# Patient Record
Sex: MALE | Race: WHITE | NOT HISPANIC OR LATINO | Employment: FULL TIME | URBAN - NONMETROPOLITAN AREA
[De-identification: names, ages, dates, MRNs, and addresses within clinical notes are randomized per-mention and may not be internally consistent; named-entity substitution may affect disease eponyms.]

---

## 2017-01-12 RX ORDER — LISINOPRIL 10 MG/1
TABLET ORAL
Qty: 60 TABLET | Refills: 0 | Status: CANCELLED | OUTPATIENT
Start: 2017-01-12

## 2017-01-13 RX ORDER — LISINOPRIL 10 MG/1
10 TABLET ORAL DAILY
Qty: 30 TABLET | Refills: 4 | Status: SHIPPED | OUTPATIENT
Start: 2017-01-13 | End: 2018-10-16 | Stop reason: ALTCHOICE

## 2017-06-22 RX ORDER — RIVAROXABAN 20 MG/1
TABLET, FILM COATED ORAL
Qty: 30 TABLET | Refills: 0 | Status: CANCELLED | OUTPATIENT
Start: 2017-06-22

## 2017-08-01 ENCOUNTER — OFFICE VISIT (OUTPATIENT)
Dept: CARDIAC SURGERY | Facility: CLINIC | Age: 67
End: 2017-08-01

## 2017-08-01 VITALS
OXYGEN SATURATION: 98 % | BODY MASS INDEX: 27.8 KG/M2 | TEMPERATURE: 97.3 F | DIASTOLIC BLOOD PRESSURE: 76 MMHG | WEIGHT: 183.4 LBS | SYSTOLIC BLOOD PRESSURE: 136 MMHG | HEART RATE: 60 BPM | HEIGHT: 68 IN

## 2017-08-01 DIAGNOSIS — Z51.81 ANTICOAGULATION MANAGEMENT ENCOUNTER: ICD-10-CM

## 2017-08-01 DIAGNOSIS — Z79.01 ANTICOAGULATION MANAGEMENT ENCOUNTER: ICD-10-CM

## 2017-08-01 DIAGNOSIS — D68.59 HYPERCOAGULABLE STATE (HCC): Primary | ICD-10-CM

## 2017-08-01 PROCEDURE — 99212 OFFICE O/P EST SF 10 MIN: CPT | Performed by: NURSE PRACTITIONER

## 2017-08-01 RX ORDER — HYDROCODONE BITARTRATE AND ACETAMINOPHEN 5; 325 MG/1; MG/1
1 TABLET ORAL EVERY 6 HOURS PRN
COMMUNITY

## 2017-08-01 NOTE — PATIENT INSTRUCTIONS
Tolerating xarelto well  Refill provided.   Follow up 1 year  Notify provider if you experience excessive bleeding from the nose, cuts, gums, rectum, urinary tract   Reddish or brown urine or stool. Vomiting of blood or hemorrhoidal bleeding. If major injury occurs present to the Emergency Department.

## 2017-08-02 NOTE — PROGRESS NOTES
Subjective   Patient ID: Miles Garza is a 66 y.o. male is here today for follow-up anticoagulation.  CC:  Previous DVT   No leg swelling or pain.   No family members with hypercoag disorder.     History of Present Illness   Mr Garza is a 65 y/o gentleman seen by Dr Travis 7/27/13 for chronic DVT.  He developed LLE after orthpedic surgery.   Continues on Xarelto.  He is tolerating well no unexplained bruising or any bleeding.  Does not have prolonged bleeding after cutting himself.  He returns today in follow up for anticoagulation evaluation.  Denies any new symptoms.  Chronic intermittent edema  continues.  Wears Unilateral Chin hose.  He is working 11 hrs a day 6 days a week, does have edema with job activities of standing.  He is hypercoagable with Factor V Leiden.  He is now on life long anticoagulation.  He returns for evaluation of his tolerance of anticoagulation.     PCP:  [facility]    7/24/13  LE Venous Duplex:  Chronic DVT in LEFT SFV, popliteal, & peroneal veins with prominence of deep FV.  02/12/14  LE Venous Duplex:  DVT in LEFT SFV, popliteal, & proximal peroneal veins with improvement in distal SFV & proximal peroneal veins.    8/14/14   LE Venous Duplex:  Chronic DVT in LEFT SFV & popliteal veins with recanalization in popliteal and distal SFA.  Minimal recanaization in proximal and mid SFV.  2/18/15    LE Venous Duplex:  Chronic DVT in LEFT SFV & popliteal veins & femoral veins.  None in deep femoral or PT, peroneal, GSV, or SSV.    7/5/16  LE Venous Duplex:  Chronic DVT in LEFT SFV & popliteal vein with atrophy of LEFT SVG.   None in deep femoral or PT, peroneal, GSV, or SSV.  :  Does not smoke  8/3/16  Lab Drawn  8/11/16 Results obtained:  Factor V Leiden +  Homocystine elevated as well.   See lab results.     The following portions of the patient's history were reviewed and updated as appropriate: allergies, current medications, past family history, past medical history, past social  history, past surgical history and problem list.    Current Outpatient Prescriptions:   •  HYDROcodone-acetaminophen (NORCO) 5-325 MG per tablet, Take 1 tablet by mouth Every 6 (Six) Hours As Needed., Disp: , Rfl:   •  lisinopril (PRINIVIL,ZESTRIL) 10 MG tablet, Take 1 tablet by mouth Daily., Disp: 30 tablet, Rfl: 4  •  rivaroxaban (XARELTO) 20 MG tablet, Take 1 tablet by mouth Daily., Disp: 30 tablet, Rfl: 4    Review of Systems   Constitution: Negative for decreased appetite and weight loss.   HENT: Negative for hearing loss, hoarse voice and nosebleeds.    Eyes: Negative for visual disturbance.   Cardiovascular: Negative for claudication, dyspnea on exertion and leg swelling.   Respiratory: Negative for cough, hemoptysis and shortness of breath.    Hematologic/Lymphatic: Does not bruise/bleed easily.   Skin: Negative for color change.   Musculoskeletal: Positive for arthritis and joint pain.   Gastrointestinal: Negative for heartburn, hematemesis and melena.   Genitourinary: Negative for hematuria.   Neurological: Negative for brief paralysis, difficulty with concentration, dizziness, paresthesias and seizures.   Psychiatric/Behavioral: Negative for altered mental status.        Objective   Physical Exam   Constitutional: He is oriented to person, place, and time. He appears well-developed and well-nourished.   HENT:   Head: Normocephalic.   Mouth/Throat: Oropharynx is clear and moist.   Eyes: Conjunctivae are normal. Pupils are equal, round, and reactive to light.   Neck: Neck supple. No JVD present.   Cardiovascular: Normal rate, regular rhythm, normal heart sounds and intact distal pulses.    Pulmonary/Chest: Effort normal and breath sounds normal. He has no wheezes.   Abdominal: Soft. Bowel sounds are normal.   Musculoskeletal: He exhibits no edema or tenderness.   Neurological: He is alert and oriented to person, place, and time. No cranial nerve deficit.   Skin: Skin is warm and dry. No rash noted. No  erythema. No pallor.   Mucous membranes pink  Palms pink     Psychiatric: Judgment normal.   Nursing note and vitals reviewed.      Assessment/Plan   Independent Review of Radiographic Studies:    None with present visit  1. Hypercoagulable state  Factor V Leiden is positive  Make clots  Take Xarelto  Tolerating well.    Homocysteine is positive  Baby aspirin and folic acid     2. Anticoagulation management encounter  Tolerating Xarelto well  Will follow for anticoagulation or PCP.   Tolerating xarelto well  Refill provided.   Follow up 1 year  Notify provider if you experience excessive bleeding from the nose, cuts, gums, rectum, urinary tract   Reddish or brown urine or stool. Vomiting of blood or hemorrhoidal bleeding. If major injury occurs present to the Emergency Department.

## 2018-07-12 RX ORDER — RIVAROXABAN 20 MG/1
TABLET, FILM COATED ORAL
Qty: 30 TABLET | Refills: 0 | Status: SHIPPED | OUTPATIENT
Start: 2018-07-12 | End: 2018-08-16 | Stop reason: SDUPTHER

## 2018-08-16 RX ORDER — RIVAROXABAN 20 MG/1
TABLET, FILM COATED ORAL
Qty: 30 TABLET | Refills: 0 | Status: SHIPPED | OUTPATIENT
Start: 2018-08-16 | End: 2018-09-18 | Stop reason: SDUPTHER

## 2018-10-16 ENCOUNTER — OFFICE VISIT (OUTPATIENT)
Dept: CARDIAC SURGERY | Facility: CLINIC | Age: 68
End: 2018-10-16

## 2018-10-16 VITALS
BODY MASS INDEX: 27.61 KG/M2 | WEIGHT: 182.2 LBS | TEMPERATURE: 98.5 F | SYSTOLIC BLOOD PRESSURE: 160 MMHG | OXYGEN SATURATION: 98 % | HEART RATE: 75 BPM | DIASTOLIC BLOOD PRESSURE: 86 MMHG | HEIGHT: 68 IN

## 2018-10-16 DIAGNOSIS — Z79.01 CURRENT USE OF LONG TERM ANTICOAGULATION: Primary | ICD-10-CM

## 2018-10-16 DIAGNOSIS — Z79.899 DRUG THERAPY: ICD-10-CM

## 2018-10-16 DIAGNOSIS — Z51.81 ANTICOAGULATION MANAGEMENT ENCOUNTER: ICD-10-CM

## 2018-10-16 DIAGNOSIS — D68.59 HYPERCOAGULABLE STATE (HCC): ICD-10-CM

## 2018-10-16 DIAGNOSIS — Z79.01 ANTICOAGULATION MANAGEMENT ENCOUNTER: ICD-10-CM

## 2018-10-16 PROCEDURE — 99213 OFFICE O/P EST LOW 20 MIN: CPT | Performed by: NURSE PRACTITIONER

## 2018-10-16 RX ORDER — LOSARTAN POTASSIUM 50 MG/1
TABLET ORAL
COMMUNITY
Start: 2018-08-14

## 2018-10-16 NOTE — PATIENT INSTRUCTIONS
Continue Xarelto  Obtain lab work at [facility]   FU 6 mth unless problems occur

## 2018-10-17 NOTE — PROGRESS NOTES
Subjective   Patient ID: Miles Garza is a 67 y.o. male is here today for follow-up anticoagulation.  CC:  Previous DVT   No leg swelling or pain.   No family members with hypercoag disorder.   No bleeding or unexplained bruising.     Anticoagulation   Pertinent negatives include no coughing.      Mr Garza is a 66 y/o gentleman seen by Dr Travis 7/27/13 for chronic DVT.  He developed LLE after orthpedic surgery.   Continues on Xarelto.  He is tolerating well no unexplained bruising or any bleeding.  Does not have prolonged bleeding after cutting himself.  He returns today in follow up for anticoagulation evaluation.  Denies any new symptoms.  Chronic intermittent edema  continues.  Wears Unilateral Chin hose.  He is working 11 hrs a day 6 days a week, does have edema with job activities of standing.  He is hypercoagable with Factor V Leiden.  He is now on life long anticoagulation.  He returns for evaluation of his tolerance of anticoagulation.   No recent hospitalizations.     PCP:  [facility]    7/24/13  LE Venous Duplex:  Chronic DVT in LEFT SFV, popliteal, & peroneal veins with prominence of deep FV.  02/12/14  LE Venous Duplex:  DVT in LEFT SFV, popliteal, & proximal peroneal veins with improvement in distal SFV & proximal peroneal veins.    8/14/14   LE Venous Duplex:  Chronic DVT in LEFT SFV & popliteal veins with recanalization in popliteal and distal SFA.  Minimal recanaization in proximal and mid SFV.  2/18/15    LE Venous Duplex:  Chronic DVT in LEFT SFV & popliteal veins & femoral veins.  None in deep femoral or PT, peroneal, GSV, or SSV.    7/5/16  LE Venous Duplex:  Chronic DVT in LEFT SFV & popliteal vein with atrophy of LEFT SVG.   None in deep femoral or PT, peroneal, GSV, or SSV.  :  Does not smoke  8/3/16  Lab Drawn  8/11/16 Results obtained:  Factor V Leiden +  Homocystine elevated as well.   See lab results.     The following portions of the patient's history were reviewed and updated as  appropriate: allergies, current medications, past family history, past medical history, past social history, past surgical history and problem list.    Current Outpatient Prescriptions:   •  HYDROcodone-acetaminophen (NORCO) 5-325 MG per tablet, Take 1 tablet by mouth Every 6 (Six) Hours As Needed., Disp: , Rfl:   •  losartan (COZAAR) 50 MG tablet, , Disp: , Rfl:   •  rivaroxaban (XARELTO) 20 MG tablet, Take 1 tablet by mouth Daily With Dinner., Disp: 30 tablet, Rfl: 5    Review of Systems   Constitution: Negative for decreased appetite and weight loss.   HENT: Negative for hearing loss, hoarse voice and nosebleeds.    Eyes: Negative for visual disturbance.   Cardiovascular: Negative for claudication, dyspnea on exertion and leg swelling.   Respiratory: Negative for cough, hemoptysis and shortness of breath.    Hematologic/Lymphatic: Does not bruise/bleed easily.   Skin: Negative for color change.   Musculoskeletal: Positive for arthritis and joint pain.   Gastrointestinal: Negative for heartburn, hematemesis and melena.   Genitourinary: Negative for hematuria.   Neurological: Negative for brief paralysis, difficulty with concentration, dizziness, paresthesias and seizures.   Psychiatric/Behavioral: Negative for altered mental status.        Objective   Physical Exam   Constitutional: He is oriented to person, place, and time. He appears well-developed and well-nourished.   HENT:   Head: Normocephalic.   Mouth/Throat: Oropharynx is clear and moist.   Eyes: Pupils are equal, round, and reactive to light. Conjunctivae are normal.   Neck: Neck supple. No JVD present.   Cardiovascular: Normal rate, regular rhythm, normal heart sounds and intact distal pulses.    Pulmonary/Chest: Effort normal and breath sounds normal. He has no wheezes.   Abdominal: Soft. Bowel sounds are normal.   Musculoskeletal: He exhibits no edema or tenderness.   Neurological: He is alert and oriented to person, place, and time. No cranial nerve  deficit.   Skin: Skin is warm and dry. No rash noted. No erythema. No pallor.   Mucous membranes pink  Palms pink     Psychiatric: Judgment normal.   Nursing note and vitals reviewed.      Assessment/Plan   Independent Review of Radiographic Studies:    None with present visit    1. Hypercoagulable state  Factor V Leiden is positive  Make clots  Take Xarelto  Tolerating well.    Homocysteine is positive  Baby aspirin and folic acid   Will check CBC for assessment of occult blood losst  Will check CMP for renal function  Will report results to patient by phone as he desires with FU letter.     2. Anticoagulation management encounter  Tolerating Xarelto well  Will follow for anticoagulation or PCP.   Tolerating xarelto well  Refill provided.   Follow up 6 mths   Notify provider if you experience excessive bleeding from the nose, cuts, gums, rectum, urinary tract   Reddish or brown urine or stool. Vomiting of blood or hemorrhoidal bleeding. If major injury occurs present to the Emergency Department.

## 2022-08-31 ENCOUNTER — APPOINTMENT (OUTPATIENT)
Dept: ONCOLOGY | Facility: CLINIC | Age: 72
End: 2022-08-31